# Patient Record
Sex: MALE | Race: WHITE | Employment: UNEMPLOYED | URBAN - METROPOLITAN AREA
[De-identification: names, ages, dates, MRNs, and addresses within clinical notes are randomized per-mention and may not be internally consistent; named-entity substitution may affect disease eponyms.]

---

## 2022-01-05 ENCOUNTER — APPOINTMENT (OUTPATIENT)
Dept: URGENT CARE | Facility: CLINIC | Age: 42
End: 2022-01-05

## 2022-02-06 ENCOUNTER — HEALTH MAINTENANCE LETTER (OUTPATIENT)
Age: 42
End: 2022-02-06

## 2022-04-20 ENCOUNTER — OFFICE VISIT (OUTPATIENT)
Dept: DERMATOLOGY | Facility: CLINIC | Age: 42
End: 2022-04-20
Attending: DERMATOLOGY
Payer: COMMERCIAL

## 2022-04-20 VITALS
WEIGHT: 211.86 LBS | HEART RATE: 79 BPM | SYSTOLIC BLOOD PRESSURE: 112 MMHG | DIASTOLIC BLOOD PRESSURE: 76 MMHG | BODY MASS INDEX: 27.76 KG/M2

## 2022-04-20 DIAGNOSIS — L91.8 INFLAMED SKIN TAG: ICD-10-CM

## 2022-04-20 DIAGNOSIS — D17.1 LIPOMA OF TORSO: Primary | ICD-10-CM

## 2022-04-20 PROCEDURE — G0463 HOSPITAL OUTPT CLINIC VISIT: HCPCS

## 2022-04-20 PROCEDURE — 17110 DESTRUCTION B9 LES UP TO 14: CPT | Performed by: DERMATOLOGY

## 2022-04-20 PROCEDURE — 99203 OFFICE O/P NEW LOW 30 MIN: CPT | Mod: 25 | Performed by: DERMATOLOGY

## 2022-04-20 ASSESSMENT — PAIN SCALES - GENERAL: PAINLEVEL: NO PAIN (0)

## 2022-04-20 NOTE — PATIENT INSTRUCTIONS
[facility]- Pediatric Dermatology  Dr. Angie Burr, Dr. Judah Lopez, Dr. Briana Giraldo, Dr. Shyanne Bustillo, BEL[address]N [address] Dr. Chantale Crouch    Non Urgent  Nurse Triage Line; [phone]- Nanette and Kaylin SWAN Care Coordinators    Elizabeth (/Complex ) [phone]    If you need a prescription refill, please contact your pharmacy. Refills are approved or denied by our Physicians during normal business hours, Monday through Fridays  Per office policy, refills will not be granted if you have not been seen within the past year (or sooner depending on your child's condition)      Scheduling Information:   Pediatric Appointment Scheduling and Call Center [phone]   Radiology Scheduling- [phone]   Sedation Unit Scheduling- [phone]  [place] Scheduling- [facility] [phone]; Pediatric Dermatology Clinic [phone]  Main  Services: [phone]   Guatemalan: [phone]   Indonesian: [phone]   Hmong/Swiss/Issac: [phone]    Preadmission Nursing Department Fax Number: [phone] (Fax all pre-operative paperwork to this number)      For urgent matters arising during evenings, weekends, or holidays that cannot wait for normal business hours please call [phone] and ask for the Dermatology Resident On-Call to be paged.

## 2022-04-20 NOTE — PROGRESS NOTES
CHIEF COMPLAINT:  Skin tag by eye.    DERMATOLOGY PROBLEM LIST:  1.  Acrochordon.  2.  Lipoma.    HISTORY OF PRESENT ILLNESS:  Mr. Ramos is a 41-year-old male presenting to Dermatology for evaluation and treatment of a growth by his right eye.  It is increasing in size and bothersome.  He has had no past treatment.  In addition, he has a lump on his right upper back.  This has been growing slowly over the last decade.  It is not currently painful.    PAST MEDICAL HISTORY:  Otherwise healthy.    ALLERGIES:  None.    MEDICATIONS:  None.    SOCIAL HISTORY:  The patient is  with 3 children.  He lives in [place].    REVIEW OF SYSTEMS:  A 12-point review of systems was collected and was negative.    PHYSICAL EXAMINATION:  GENERAL:  The patient is a healthy-appearing 41-year-old male, in no distress.  SKIN:  Exam was focused on the face and back.  Examination of the face shows 3 mm pedunculated fleshy papule just inferior to the lid margin on the right lower eyelid.  Examination of the right upper back over the shoulder and upper midback shows an approximately 8 cm soft subcutaneous nodule.    ASSESSMENT AND PLAN:  1.  Irritated skin tag at the right eyelid.  Area treated with three 10-second freeze-thaw cycles with cold forceps.  Discussed potential wound care.  2.  Presumed lipoma, right upper back.  Referral placed to General Surgery.  Family to reach out to schedule an appointment.    The patient to return to Dermatology Clinic as needed.    Briana Giraldo MD   of Dermatology  [facility]

## 2022-04-20 NOTE — LETTER
4/20/2022      RE: Luis Carlos Ramos   [address]  [facility] [address]       CHIEF COMPLAINT:  Skin tag by eye.    DERMATOLOGY PROBLEM LIST:  1.  Acrochordon.  2.  Lipoma.    HISTORY OF PRESENT ILLNESS:  Mr. Ramos is a 41-year-old male presenting to Dermatology for evaluation and treatment of a growth by his right eye.  It is increasing in size and bothersome.  He has had no past treatment.  In addition, he has a lump on his right upper back.  This has been growing slowly over the last decade.  It is not currently painful.    PAST MEDICAL HISTORY:  Otherwise healthy.    ALLERGIES:  None.    MEDICATIONS:  None.    SOCIAL HISTORY:  The patient is  with 3 children.  He lives in [place].    REVIEW OF SYSTEMS:  A 12-point review of systems was collected and was negative.    PHYSICAL EXAMINATION:  GENERAL:  The patient is a healthy-appearing 41-year-old male, in no distress.  SKIN:  Exam was focused on the face and back.  Examination of the face shows 3 mm pedunculated fleshy papule just inferior to the lid margin on the right lower eyelid.  Examination of the right upper back over the shoulder and upper midback shows an approximately 8 cm soft subcutaneous nodule.    ASSESSMENT AND PLAN:  1.  Irritated skin tag at the right eyelid.  Area treated with three 10-second freeze-thaw cycles with cold forceps.  Discussed potential wound care.  2.  Presumed lipoma, right upper back.  Referral placed to General Surgery.  Family to reach out to schedule an appointment.    The patient to return to Dermatology Clinic as needed.    Briana Giraldo MD   of Dermatology  [facility]

## 2022-04-20 NOTE — NURSING NOTE
"[facility] [[id number]]  Chief Complaint   Patient presents with     Consult     Skin tag     Initial /76   Pulse 79   Wt 211 lb 13.8 oz (96.1 kg)   BMI 27.76 kg/m   Estimated body mass index is 27.76 kg/m  as calculated from the following:    Height as of 8/9/12: 6' 1.25\" (186.1 cm).    Weight as of this encounter: 211 lb 13.8 oz (96.1 kg).  Medication Reconciliation: complete      "

## 2022-09-03 ENCOUNTER — HEALTH MAINTENANCE LETTER (OUTPATIENT)
Age: 42
End: 2022-09-03

## 2022-09-29 ENCOUNTER — TRANSFERRED RECORDS (OUTPATIENT)
Dept: HEALTH INFORMATION MANAGEMENT | Facility: CLINIC | Age: 42
End: 2022-09-29

## 2022-10-07 ENCOUNTER — TRANSFERRED RECORDS (OUTPATIENT)
Dept: HEALTH INFORMATION MANAGEMENT | Facility: CLINIC | Age: 42
End: 2022-10-07

## 2022-11-24 ENCOUNTER — TELEPHONE (OUTPATIENT)
Dept: PEDIATRICS | Facility: CLINIC | Age: 42
End: 2022-11-24

## 2022-11-24 DIAGNOSIS — J11.1 INFLUENZA: Primary | ICD-10-CM

## 2022-11-24 RX ORDER — OSELTAMIVIR PHOSPHATE 75 MG/1
75 CAPSULE ORAL 2 TIMES DAILY
Qty: 10 CAPSULE | Refills: 0 | Status: SHIPPED | OUTPATIENT
Start: 2022-11-24 | End: 2022-11-29

## 2022-11-24 NOTE — TELEPHONE ENCOUNTER
Patient's wife calling in, patient is exhibiting symptoms of influenza and son is infected with known influenza. Wife has chronic lung disease, requesting treatment for  with Tamiflu given likelihood of infection and high risk for herself.    Prescription for Tamiflu sent to patient's requested pharmacy.    Kimberly Velásquez MD  Internal Medicine-Pediatrics

## 2023-04-29 ENCOUNTER — HEALTH MAINTENANCE LETTER (OUTPATIENT)
Age: 43
End: 2023-04-29

## 2023-10-05 ENCOUNTER — TRANSFERRED RECORDS (OUTPATIENT)
Dept: HEALTH INFORMATION MANAGEMENT | Facility: CLINIC | Age: 43
End: 2023-10-05
Payer: COMMERCIAL

## 2024-01-11 ENCOUNTER — OFFICE VISIT (OUTPATIENT)
Dept: DERMATOLOGY | Facility: CLINIC | Age: 44
End: 2024-01-11
Attending: DERMATOLOGY

## 2024-01-11 VITALS — HEIGHT: 73 IN | BODY MASS INDEX: 25.84 KG/M2 | WEIGHT: 195 LBS

## 2024-01-11 DIAGNOSIS — L57.0 AK (ACTINIC KERATOSIS): Primary | ICD-10-CM

## 2024-01-11 PROCEDURE — 99213 OFFICE O/P EST LOW 20 MIN: CPT | Performed by: DERMATOLOGY

## 2024-01-11 PROCEDURE — 17000 DESTRUCT PREMALG LESION: CPT | Performed by: DERMATOLOGY

## 2024-01-11 PROCEDURE — 17003 DESTRUCT PREMALG LES 2-14: CPT | Performed by: DERMATOLOGY

## 2024-01-11 ASSESSMENT — PAIN SCALES - GENERAL: PAINLEVEL: NO PAIN (0)

## 2024-01-11 NOTE — PROGRESS NOTES
CHIEF COMPLAINT:  Skin tag by eye.    DERMATOLOGY PROBLEM LIST:  1.  Acrochordon.  2.  Lipoma.  3.  AK     HISTORY OF PRESENT ILLNESS:  Mr. Ramos is a 43-year-old male presenting to Dermatology for evaluation and treatment of a growth on the scalp for several months. Not healing. Some mild tenderness.     PAST MEDICAL HISTORY:  Otherwise healthy.    ALLERGIES:  None.    MEDICATIONS:  None.    SOCIAL HISTORY:  The patient is  with 3 children.  He lives in [place].    PHYSICAL EXAMINATION:  GENERAL:  The patient is a healthy-appearing 43-year-old male, in no distress.  SKIN:  Exam was focused on the face and scalp  -Gritty approx 4 mm papule on the vertex scalp  -Gritty papule on the R helix, L vertex, L infraorbital rim and temple  -Telangiectasias on the central face    ASSESSMENT AND PLAN:  Actinic keratoses: 5 lesions treated with a 10 sec freeze/thaw cycle with liquid nitrogen. Wound care info provided. Discussed risks of pain, blistering, scar, incomplete removal. Patient advised to return should lesions fail to resolve.      RTC as needed.     Briana Giraldo MD   of Dermatology  [facility]

## 2024-01-11 NOTE — LETTER
1/11/2024      RE: Luis Carlos Ramos  [address] [facility] [address]     Dear Colleague,    Thank you for the opportunity to participate in the care of your patient, Luis Carlos Ramos, at the [facility] at [facility]. Please see a copy of my visit note below.    CHIEF COMPLAINT:  Skin tag by eye.    DERMATOLOGY PROBLEM LIST:  1.  Acrochordon.  2.  Lipoma.  3.  AK     HISTORY OF PRESENT ILLNESS:  Mr. Ramos is a 43-year-old male presenting to Dermatology for evaluation and treatment of a growth on the scalp for several months. Not healing. Some mild tenderness.     PAST MEDICAL HISTORY:  Otherwise healthy.    ALLERGIES:  None.    MEDICATIONS:  None.    SOCIAL HISTORY:  The patient is  with 3 children.  He lives in [place].    PHYSICAL EXAMINATION:  GENERAL:  The patient is a healthy-appearing 43-year-old male, in no distress.  SKIN:  Exam was focused on the face and scalp  -Gritty approx 4 mm papule on the vertex scalp  -Gritty papule on the R helix, L vertex, L infraorbital rim and temple  -Telangiectasias on the central face    ASSESSMENT AND PLAN:  Actinic keratoses: 5 lesions treated with a 10 sec freeze/thaw cycle with liquid nitrogen. Wound care info provided. Discussed risks of pain, blistering, scar, incomplete removal. Patient advised to return should lesions fail to resolve.      RTC as needed.     Briana Giraldo MD   of Dermatology  [facility]

## 2024-07-06 ENCOUNTER — HEALTH MAINTENANCE LETTER (OUTPATIENT)
Age: 44
End: 2024-07-06

## 2024-10-09 ENCOUNTER — APPOINTMENT (OUTPATIENT)
Dept: GENERAL RADIOLOGY | Facility: CLINIC | Age: 44
End: 2024-10-09
Attending: EMERGENCY MEDICINE
Payer: COMMERCIAL

## 2024-10-09 ENCOUNTER — HOSPITAL ENCOUNTER (EMERGENCY)
Facility: CLINIC | Age: 44
Discharge: HOME | End: 2024-10-09
Attending: EMERGENCY MEDICINE | Admitting: EMERGENCY MEDICINE
Payer: COMMERCIAL

## 2024-10-09 VITALS
TEMPERATURE: 97.4 F | SYSTOLIC BLOOD PRESSURE: 136 MMHG | BODY MASS INDEX: 28.58 KG/M2 | DIASTOLIC BLOOD PRESSURE: 95 MMHG | WEIGHT: 210.98 LBS | RESPIRATION RATE: 16 BRPM | OXYGEN SATURATION: 99 % | HEIGHT: 72 IN | HEART RATE: 73 BPM

## 2024-10-09 DIAGNOSIS — R07.89 CHEST PAIN, NON-CARDIAC: ICD-10-CM

## 2024-10-09 LAB
ANION GAP SERPL CALCULATED.3IONS-SCNC: 12 MMOL/L (ref 7–15)
BASOPHILS # BLD AUTO: 0.1 10E3/UL (ref 0–0.2)
BASOPHILS NFR BLD AUTO: 1 %
BUN SERPL-MCNC: 17.2 MG/DL (ref 6–20)
CALCIUM SERPL-MCNC: 9.5 MG/DL (ref 8.8–10.4)
CHLORIDE SERPL-SCNC: 101 MMOL/L (ref 98–107)
CREAT SERPL-MCNC: 0.88 MG/DL (ref 0.67–1.17)
EGFRCR SERPLBLD CKD-EPI 2021: >90 ML/MIN/1.73M2
EOSINOPHIL # BLD AUTO: 0.1 10E3/UL (ref 0–0.7)
EOSINOPHIL NFR BLD AUTO: 1 %
ERYTHROCYTE [DISTWIDTH] IN BLOOD BY AUTOMATED COUNT: 12 % (ref 10–15)
GLUCOSE SERPL-MCNC: 74 MG/DL (ref 70–99)
HCO3 SERPL-SCNC: 25 MMOL/L (ref 22–29)
HCT VFR BLD AUTO: 44.9 % (ref 40–53)
HGB BLD-MCNC: 15.6 G/DL (ref 13.3–17.7)
HOLD SPECIMEN: NORMAL
HOLD SPECIMEN: NORMAL
IMM GRANULOCYTES # BLD: 0 10E3/UL
IMM GRANULOCYTES NFR BLD: 0 %
LYMPHOCYTES # BLD AUTO: 2.7 10E3/UL (ref 0.8–5.3)
LYMPHOCYTES NFR BLD AUTO: 40 %
MCH RBC QN AUTO: 29.5 PG (ref 26.5–33)
MCHC RBC AUTO-ENTMCNC: 34.7 G/DL (ref 31.5–36.5)
MCV RBC AUTO: 85 FL (ref 78–100)
MONOCYTES # BLD AUTO: 0.6 10E3/UL (ref 0–1.3)
MONOCYTES NFR BLD AUTO: 9 %
NEUTROPHILS # BLD AUTO: 3.3 10E3/UL (ref 1.6–8.3)
NEUTROPHILS NFR BLD AUTO: 49 %
NRBC # BLD AUTO: 0 10E3/UL
NRBC BLD AUTO-RTO: 0 /100
PLATELET # BLD AUTO: 202 10E3/UL (ref 150–450)
POTASSIUM SERPL-SCNC: 4.1 MMOL/L (ref 3.4–5.3)
RBC # BLD AUTO: 5.29 10E6/UL (ref 4.4–5.9)
SODIUM SERPL-SCNC: 138 MMOL/L (ref 135–145)
TROPONIN T SERPL HS-MCNC: <6 NG/L
TROPONIN T SERPL HS-MCNC: <6 NG/L
WBC # BLD AUTO: 6.7 10E3/UL (ref 4–11)

## 2024-10-09 PROCEDURE — 80048 BASIC METABOLIC PNL TOTAL CA: CPT | Performed by: EMERGENCY MEDICINE

## 2024-10-09 PROCEDURE — 93005 ELECTROCARDIOGRAM TRACING: CPT

## 2024-10-09 PROCEDURE — 84484 ASSAY OF TROPONIN QUANT: CPT | Performed by: EMERGENCY MEDICINE

## 2024-10-09 PROCEDURE — 36415 COLL VENOUS BLD VENIPUNCTURE: CPT | Performed by: EMERGENCY MEDICINE

## 2024-10-09 PROCEDURE — 85025 COMPLETE CBC W/AUTO DIFF WBC: CPT | Performed by: EMERGENCY MEDICINE

## 2024-10-09 PROCEDURE — 71046 X-RAY EXAM CHEST 2 VIEWS: CPT

## 2024-10-09 PROCEDURE — 99285 EMERGENCY DEPT VISIT HI MDM: CPT

## 2024-10-09 PROCEDURE — 85004 AUTOMATED DIFF WBC COUNT: CPT | Performed by: EMERGENCY MEDICINE

## 2024-10-09 ASSESSMENT — COLUMBIA-SUICIDE SEVERITY RATING SCALE - C-SSRS
2. HAVE YOU ACTUALLY HAD ANY THOUGHTS OF KILLING YOURSELF IN THE PAST MONTH?: NO
6. HAVE YOU EVER DONE ANYTHING, STARTED TO DO ANYTHING, OR PREPARED TO DO ANYTHING TO END YOUR LIFE?: NO
1. IN THE PAST MONTH, HAVE YOU WISHED YOU WERE DEAD OR WISHED YOU COULD GO TO SLEEP AND NOT WAKE UP?: NO

## 2024-10-09 ASSESSMENT — ACTIVITIES OF DAILY LIVING (ADL)
ADLS_ACUITY_SCORE: 35

## 2024-10-09 NOTE — ED TRIAGE NOTES
"      Pt presents to ED with left sided chest tightness. Started today, feels tighter with movement. States about every 30 minutes he gets a \"needle prick\" feeling to his neck. Denies new or heavy lifting/work outs. Denies sob.   "

## 2024-10-10 ENCOUNTER — PATIENT OUTREACH (OUTPATIENT)
Dept: PEDIATRICS | Facility: CLINIC | Age: 44
End: 2024-10-10
Payer: COMMERCIAL

## 2024-10-10 LAB
ATRIAL RATE - MUSE: 67 BPM
DIASTOLIC BLOOD PRESSURE - MUSE: NORMAL MMHG
INTERPRETATION ECG - MUSE: NORMAL
P AXIS - MUSE: 4 DEGREES
PR INTERVAL - MUSE: 156 MS
QRS DURATION - MUSE: 108 MS
QT - MUSE: 380 MS
QTC - MUSE: 401 MS
R AXIS - MUSE: -1 DEGREES
SYSTOLIC BLOOD PRESSURE - MUSE: NORMAL MMHG
T AXIS - MUSE: 20 DEGREES
VENTRICULAR RATE- MUSE: 67 BPM

## 2024-10-10 NOTE — ED PROVIDER NOTES
Emergency Department Note      History of Present Illness     Chief Complaint   Chest Pain      HPI   Luis Carlos Ramos is a 44 year old male who presents to the ED with his wife for evaluation of chest pain. The patient states he started to experience constant left-sided chest tightness earlier today that is worse with certain movements but is otherwise unprovoked.  He points to an area over the lateral pectoralis muscle, anterior axillary line.  Also notes a sharp pain in his left neck every half hour or so that lasts only a couple of seconds before spontaneously resolving. Notes the pain feels as though he had just lifted weights although he has not recently. He was encouraged to be seen here by his wife who is a nurse. Endorses a mild dry cough. His son is currently ill with a cough as well. Reports a family history of hypertension in his brother and heart disease with stents in his father. He has a history of smoking just while in college, but quit many years ago. Denies shortness of breath, dizziness, numbness or weakness of the extremities, pain or edema of the lower extremities, fever, or chills.     Independent Historian   None    Review of External Notes   No recent relevant notes.    Past Medical History     Medical History and Problem List   No other significant past medical history or family history.    Medications   The patient is not currently taking any prescribed medications.    Physical Exam     Patient Vitals for the past 24 hrs:   BP Temp Pulse Resp SpO2 Height Weight   10/09/24 1834 (!) 133/109 -- -- -- -- -- --   10/09/24 1831 -- 97.4  F (36.3  C) 73 16 99 % 1.829 m (6') 95.7 kg (210 lb 15.7 oz)     Physical Exam  General: alert, comfortable in fast track.  HENT: mucous membranes moist, FROM in the neck, no left lateral neck swelling.  CV: regular rate, regular rhythm, no m/r/g  Resp: normal effort, clear throughout, no crackles or wheezing  GI: abdomen soft and nontender, no guarding  MSK:  no bony tenderness, no reproducible left chest wall tenderness.  Skin: appropriately warm and dry, no rash to the left chest  Extremities: no edema, calves non-tender  Neuro: alert, clear speech, oriented  Psych: normal mood and affect      Diagnostics     Lab Results   Labs Ordered and Resulted from Time of ED Arrival to Time of ED Departure   TROPONIN T, HIGH SENSITIVITY - Normal       Result Value    Troponin T, High Sensitivity <6     BASIC METABOLIC PANEL - Normal    Sodium 138      Potassium 4.1      Chloride 101      Carbon Dioxide (CO2) 25      Anion Gap 12      Urea Nitrogen 17.2      Creatinine 0.88      GFR Estimate >90      Calcium 9.5      Glucose 74     CBC WITH PLATELETS AND DIFFERENTIAL    WBC Count 6.7      RBC Count 5.29      Hemoglobin 15.6      Hematocrit 44.9      MCV 85      MCH 29.5      MCHC 34.7      RDW 12.0      Platelet Count 202      % Neutrophils 49      % Lymphocytes 40      % Monocytes 9      % Eosinophils 1      % Basophils 1      % Immature Granulocytes 0      NRBCs per 100 WBC 0      Absolute Neutrophils 3.3      Absolute Lymphocytes 2.7      Absolute Monocytes 0.6      Absolute Eosinophils 0.1      Absolute Basophils 0.1      Absolute Immature Granulocytes 0.0      Absolute NRBCs 0.0         Imaging   Chest XR,  PA & LAT   Final Result   IMPRESSION: Negative chest.          EKG   ECG taken at 1842, ECG read at 1852  Sinus rhythm   No previous EKG to compare.   Rate 67 bpm. PA interval 156 ms. QRS duration 108 ms. QT/QTc 380/401 ms. P-R-T axes 4 -1 20.    Independent Interpretation   I personally reviewed the patient's chest radiograph.  No infiltrate or pneumothorax on my interpretation.      ED Course      Medications Administered   Medications - No data to display    Procedures   Procedures     Discussion of Management   None    ED Course   ED Course as of 10/10/24 1044   Wed Oct 09, 2024   1924 I obtained history and performed a physical exam as noted above.    2236 I rechecked  and updated the patient.        Additional Documentation  None    Medical Decision Making / Diagnosis     CMS Diagnoses: None    MIPS       None    MDM   Luis Carlos Ramos is a 44 year old male otherwise healthy, who presents today with chest pain.  On exam, the patient is slightly hypertensive, otherwise has normal vitals.  Workup is reassuring, including x 2 negative troponins and EKG without signs of ischemia.  Patient is PERC negative, which rules out PE in an otherwise low risk patient.   I do not suspect aortic dissection.  He has normal mediastinum, 2+ radial pulses, and atypical history.  The patient did complain of slight left sided neck pain as well.  No suspicion for carotid artery dissection given mild pain and no risk factors (trauma, neck manipulation).  No evidence for zoster on exam.  I do not think he needs additional testing, including risk stratification, given the atypical nature of symptoms.  Return to the ED right away for worsening pain, new symptoms such as fever cough, or for any other concerns.    Disposition   The patient was discharged.     Diagnosis     ICD-10-CM    1. Chest pain, non-cardiac  R07.89            Scribe Disclosure:  I, Caryl Vela, am serving as a scribe at 7:19 PM on 10/9/2024 to document services personally performed by Destiney Faustin MD based on my observations and the provider's statements to me.        Destiney Faustin MD  10/10/24 8218

## 2024-11-22 ENCOUNTER — ANCILLARY PROCEDURE (OUTPATIENT)
Dept: GENERAL RADIOLOGY | Facility: CLINIC | Age: 44
End: 2024-11-22
Attending: PHYSICIAN ASSISTANT
Payer: COMMERCIAL

## 2024-11-22 DIAGNOSIS — R50.9 FEVER, UNSPECIFIED FEVER CAUSE: ICD-10-CM

## 2024-11-22 PROCEDURE — 71046 X-RAY EXAM CHEST 2 VIEWS: CPT | Mod: TC | Performed by: RADIOLOGY

## 2024-11-23 ENCOUNTER — TELEPHONE (OUTPATIENT)
Dept: NURSING | Facility: CLINIC | Age: 44
End: 2024-11-23
Payer: COMMERCIAL

## 2024-11-23 NOTE — TELEPHONE ENCOUNTER
Patient classified as COVID treatment eligible by Epic high risk algorithm:  Yes    Coronavirus (COVID-19) Notification    Reason for call  Notify of POSITIVE COVID-19 lab result, assess symptoms,  review [facility] recommendations    Lab Result   Lab test for 2019-nCoV rRt-PCR or SARS-COV-2 PCR  Oropharyngeal AND/OR nasopharyngeal swabs were POSITIVE for 2019-nCoV RNA [OR] SARS-COV-2 RNA (COVID-19) RNA     We have been unable to reach patient by phone at this time to notify of their Positive COVID-19 result.    Left voicemail message requesting a call back to [phone] [facility] for results.        A Positive COVID-19 letter will be sent via [employer] or the mail.    Chapin Moyer

## 2025-01-09 ENCOUNTER — OFFICE VISIT (OUTPATIENT)
Dept: PEDIATRICS | Facility: CLINIC | Age: 45
End: 2025-01-09
Payer: COMMERCIAL

## 2025-01-09 VITALS
HEIGHT: 73 IN | RESPIRATION RATE: 18 BRPM | HEART RATE: 74 BPM | BODY MASS INDEX: 28.03 KG/M2 | OXYGEN SATURATION: 98 % | DIASTOLIC BLOOD PRESSURE: 72 MMHG | WEIGHT: 211.5 LBS | SYSTOLIC BLOOD PRESSURE: 122 MMHG | TEMPERATURE: 97.1 F

## 2025-01-09 DIAGNOSIS — Z13.1 SCREENING FOR DIABETES MELLITUS: ICD-10-CM

## 2025-01-09 DIAGNOSIS — Z13.220 LIPID SCREENING: ICD-10-CM

## 2025-01-09 DIAGNOSIS — Z23 NEED FOR PROPHYLACTIC VACCINATION AND INOCULATION AGAINST INFLUENZA: ICD-10-CM

## 2025-01-09 DIAGNOSIS — Z11.59 NEED FOR HEPATITIS C SCREENING TEST: ICD-10-CM

## 2025-01-09 DIAGNOSIS — N52.9 ERECTILE DYSFUNCTION, UNSPECIFIED ERECTILE DYSFUNCTION TYPE: ICD-10-CM

## 2025-01-09 DIAGNOSIS — Z00.00 ROUTINE GENERAL MEDICAL EXAMINATION AT A HEALTH CARE FACILITY: Primary | ICD-10-CM

## 2025-01-09 DIAGNOSIS — Z11.4 SCREENING FOR HIV (HUMAN IMMUNODEFICIENCY VIRUS): ICD-10-CM

## 2025-01-09 DIAGNOSIS — Z12.11 SCREEN FOR COLON CANCER: ICD-10-CM

## 2025-01-09 LAB
ALBUMIN SERPL BCG-MCNC: 4.4 G/DL (ref 3.5–5.2)
ALP SERPL-CCNC: 86 U/L (ref 40–150)
ALT SERPL W P-5'-P-CCNC: 25 U/L (ref 0–70)
ANION GAP SERPL CALCULATED.3IONS-SCNC: 8 MMOL/L (ref 7–15)
AST SERPL W P-5'-P-CCNC: 19 U/L (ref 0–45)
BILIRUB SERPL-MCNC: 0.3 MG/DL
BUN SERPL-MCNC: 13.4 MG/DL (ref 6–20)
CALCIUM SERPL-MCNC: 9.4 MG/DL (ref 8.8–10.4)
CHLORIDE SERPL-SCNC: 104 MMOL/L (ref 98–107)
CHOLEST SERPL-MCNC: 292 MG/DL
CREAT SERPL-MCNC: 0.88 MG/DL (ref 0.67–1.17)
EGFRCR SERPLBLD CKD-EPI 2021: >90 ML/MIN/1.73M2
EST. AVERAGE GLUCOSE BLD GHB EST-MCNC: 117 MG/DL
FASTING STATUS PATIENT QL REPORTED: YES
FASTING STATUS PATIENT QL REPORTED: YES
GLUCOSE SERPL-MCNC: 97 MG/DL (ref 70–99)
HBA1C MFR BLD: 5.7 % (ref 0–5.6)
HCO3 SERPL-SCNC: 28 MMOL/L (ref 22–29)
HCV AB SERPL QL IA: NONREACTIVE
HDLC SERPL-MCNC: 45 MG/DL
HIV 1+2 AB+HIV1 P24 AG SERPL QL IA: NONREACTIVE
LDLC SERPL CALC-MCNC: 206 MG/DL
NONHDLC SERPL-MCNC: 247 MG/DL
POTASSIUM SERPL-SCNC: 4.7 MMOL/L (ref 3.4–5.3)
PROT SERPL-MCNC: 6.9 G/DL (ref 6.4–8.3)
SODIUM SERPL-SCNC: 140 MMOL/L (ref 135–145)
TRIGL SERPL-MCNC: 203 MG/DL

## 2025-01-09 RX ORDER — SILDENAFIL CITRATE 20 MG/1
TABLET ORAL
Qty: 30 TABLET | Refills: 5 | Status: SHIPPED | OUTPATIENT
Start: 2025-01-09

## 2025-01-09 SDOH — HEALTH STABILITY: PHYSICAL HEALTH: ON AVERAGE, HOW MANY DAYS PER WEEK DO YOU ENGAGE IN MODERATE TO STRENUOUS EXERCISE (LIKE A BRISK WALK)?: 2 DAYS

## 2025-01-09 SDOH — HEALTH STABILITY: PHYSICAL HEALTH: ON AVERAGE, HOW MANY MINUTES DO YOU ENGAGE IN EXERCISE AT THIS LEVEL?: 20 MIN

## 2025-01-09 ASSESSMENT — SOCIAL DETERMINANTS OF HEALTH (SDOH): HOW OFTEN DO YOU GET TOGETHER WITH FRIENDS OR RELATIVES?: ONCE A WEEK

## 2025-01-09 ASSESSMENT — PAIN SCALES - GENERAL: PAINLEVEL_OUTOF10: MILD PAIN (3)

## 2025-01-09 NOTE — PROGRESS NOTES
"Preventive Care Visit  [facility] JESSICA Zhang MD, Internal Medicine - Pediatrics  Jan 9, 2025      Assessment & Plan     Routine general medical examination at a health care facility  Counseling as below. Flu and Tdap vaccines today.   - Comprehensive metabolic panel (BMP + Alb, Alk Phos, ALT, AST, Total. Bili, TP); Future    Erectile dysfunction, unspecified erectile dysfunction type  Discussed pathophysiology and treatment options. Patient interested in medication. Reviewed risks, benefits. Follow-up prn. BP well controlled, screening labs as below.   - sildenafil (REVATIO) 20 MG tablet; Take 3-5 tabs 30 minutes prior to sexual activity.    Screen for colon cancer  - Colonoscopy Screening  Referral; Future    Screening for HIV (human immunodeficiency virus)  - HIV Antigen Antibody Combo; Future    Need for hepatitis C screening test  - Hepatitis C Screen Reflex to HCV RNA Quant and Genotype; Future    Screening for diabetes mellitus  - Hemoglobin A1c; Future    Lipid screening  - Lipid panel reflex to direct LDL Non-fasting; Future    Need for prophylactic vaccination and inoculation against influenza  - flu vaccine.         BMI  Estimated body mass index is 28.1 kg/m  as calculated from the following:    Height as of this encounter: 1.848 m (6' 0.75\").    Weight as of this encounter: 95.9 kg (211 lb 8 oz).   Weight management plan: Discussed healthy diet and exercise guidelines    Counseling  Appropriate preventive services were addressed with this patient via screening, questionnaire, or discussion as appropriate for fall prevention, nutrition, physical activity, Tobacco-use cessation, social engagement, weight loss and cognition.  Checklist reviewing preventive services available has been given to the patient.  Reviewed patient's diet, addressing concerns and/or questions.   He is at risk for lack of exercise and has been provided with information to increase physical activity " for the benefit of his well-being.   The patient was instructed to see the dentist every 6 months.   He is at risk for psychosocial distress and has been provided with information to reduce risk.       Patient Instructions   Labs today.     Tetanus booster and flu shot today.     Sildenafil (viagra) sent to pharmacy - take 1-5 tabs 30 minutes prior to sexual activity as needed.     Call to schedule colonoscopy [phone]    Aramis Aldridge is a 44 year old, presenting for the following:  Physical        1/9/2025     9:07 AM   Additional Questions   Roomed by Mattie   Accompanied by none         1/9/2025     9:07 AM   Patient Reported Additional Medications   Patient reports taking the following new medications none          HPI    Overall doing relatively well. Occasional back pain with hyperextension.     Has been struggling to have and maintain erections during intercourse. Wondering if medication is appropriate at this time.     Was seen in ED for chest pain, but no issues since then and negative work-up at that time.     Health Care Directive  Patient does not have a Health Care Directive: Discussed advance care planning with patient; information given to patient to review.      1/9/2025   General Health   How would you rate your overall physical health? (!) FAIR   Feel stress (tense, anxious, or unable to sleep) To some extent   (!) STRESS CONCERN      1/9/2025   Nutrition   Three or more servings of calcium each day? Yes   Diet: Regular (no restrictions)   How many servings of fruit and vegetables per day? (!) 2-3   How many sweetened beverages each day? (!) 2         1/9/2025   Exercise   Days per week of moderate/strenous exercise 2 days   Average minutes spent exercising at this level 20 min   (!) EXERCISE CONCERN      1/9/2025   Social Factors   Frequency of gathering with friends or relatives Once a week   Worry food won't last until get money to buy more No   Food not last or not have enough money  for food? No   Do you have housing? (Housing is defined as stable permanent housing and does not include staying ouside in a car, in a tent, in an abandoned building, in an overnight shelter, or couch-surfing.) Yes   Are you worried about losing your housing? No   Lack of transportation? No   Unable to get utilities (heat,electricity)? No         1/9/2025   Dental   Dentist two times every year? (!) NO         1/9/2025   TB Screening   Were you born outside of the [place]? No         Today's PHQ-2 Score:       1/9/2025     8:08 AM   PHQ-2 ( 1999 Pfizer)   Q1: Little interest or pleasure in doing things 0   Q2: Feeling down, depressed or hopeless 0   PHQ-2 Score 0    Q1: Little interest or pleasure in doing things Not at all   Q2: Feeling down, depressed or hopeless Not at all   PHQ-2 Score 0       Patient-reported           1/9/2025   Substance Use   Alcohol more than 3/day or more than 7/wk No   Do you use any other substances recreationally? (!) CANNABIS PRODUCTS     Social History     Tobacco Use    Smoking status: Former     Current packs/day: 0.00     Types: Cigarettes     Quit date: 1/1/2010     Years since quitting: 15.0    Smokeless tobacco: Never   Vaping Use    Vaping status: Never Used   Substance Use Topics    Alcohol use: Yes     Alcohol/week: 0.8 - 1.7 standard drinks of alcohol     Types: 1 - 2 drink(s) per week    Drug use: No             1/9/2025   One time HIV Screening   Previous HIV test? Yes         1/9/2025   STI Screening   New sexual partner(s) since last STI/HIV test? No   ASCVD Risk   The ASCVD Risk score (Lindsey GARCIA, et al., 2019) failed to calculate for the following reasons:    Cannot find a previous HDL lab    Cannot find a previous total cholesterol lab        1/9/2025   Contraception/Family Planning   Questions about contraception or family planning No        Reviewed and updated as needed this visit by Provider                    Patient Active Problem List   Diagnosis   (none) -  "all problems resolved or deleted     History reviewed. No pertinent surgical history.    Social History     Tobacco Use    Smoking status: Former     Current packs/day: 0.00     Types: Cigarettes     Quit date: 1/1/2010     Years since quitting: 15.0    Smokeless tobacco: Never   Substance Use Topics    Alcohol use: Yes     Alcohol/week: 0.8 - 1.7 standard drinks of alcohol     Types: 1 - 2 drink(s) per week     Family History   Problem Relation Age of Onset    Hypertension Father     C.A.D. Father         multiple stents    Lipids Brother     Hypertension Mother     Blood Disease Maternal Aunt         d from childhood luekemia    Respiratory Paternal Aunt         lung CA             Review of Systems  Constitutional, neuro, ENT, endocrine, pulmonary, cardiac, gastrointestinal, genitourinary, musculoskeletal, integument and psychiatric systems are negative, except as otherwise noted.     Objective    Exam  /72 (BP Location: Right arm, Patient Position: Sitting, Cuff Size: Adult Regular)   Pulse 74   Temp 97.1  F (36.2  C) (Temporal)   Resp 18   Ht 1.848 m (6' 0.75\")   Wt 95.9 kg (211 lb 8 oz)   SpO2 98%   BMI 28.10 kg/m     Estimated body mass index is 28.1 kg/m  as calculated from the following:    Height as of this encounter: 1.848 m (6' 0.75\").    Weight as of this encounter: 95.9 kg (211 lb 8 oz).    Physical Exam  GENERAL: alert and no distress  EYES: Eyes grossly normal to inspection, PERRL and conjunctivae and sclerae normal  HENT: ear canals and TM's normal, nose and mouth without ulcers or lesions  NECK: no adenopathy, no asymmetry, masses, or scars  RESP: lungs clear to auscultation - no rales, rhonchi or wheezes  CV: regular rate and rhythm, normal S1 S2, no S3 or S4, no murmur, click or rub, no peripheral edema  ABDOMEN: soft, nontender, no hepatosplenomegaly, no masses and bowel sounds normal  MS: no gross musculoskeletal defects noted, no edema  SKIN: no suspicious lesions or " ebony  NEURO: Normal strength and tone, mentation intact and speech normal  PSYCH: mentation appears normal, affect normal/bright        Signed Electronically by: Kimberly Zhang MD

## 2025-01-09 NOTE — PATIENT INSTRUCTIONS
Labs today.     Tetanus booster and flu shot today.     Sildenafil (viagra) sent to pharmacy - take 1-5 tabs 30 minutes prior to sexual activity as needed.     Call to schedule colonoscopy [phone]

## 2025-01-10 ENCOUNTER — MYC MEDICAL ADVICE (OUTPATIENT)
Dept: PEDIATRICS | Facility: CLINIC | Age: 45
End: 2025-01-10
Payer: COMMERCIAL

## 2025-01-13 ENCOUNTER — MYC MEDICAL ADVICE (OUTPATIENT)
Dept: PEDIATRICS | Facility: CLINIC | Age: 45
End: 2025-01-13
Payer: COMMERCIAL

## 2025-01-13 DIAGNOSIS — E78.2 MIXED HYPERLIPIDEMIA: Primary | ICD-10-CM

## 2025-01-13 RX ORDER — ATORVASTATIN CALCIUM 20 MG/1
20 TABLET, FILM COATED ORAL DAILY
Qty: 90 TABLET | Refills: 3 | Status: SHIPPED | OUTPATIENT
Start: 2025-01-13

## 2025-04-29 DIAGNOSIS — N52.9 ERECTILE DYSFUNCTION, UNSPECIFIED ERECTILE DYSFUNCTION TYPE: ICD-10-CM

## 2025-04-30 ENCOUNTER — MYC MEDICAL ADVICE (OUTPATIENT)
Dept: PEDIATRICS | Facility: CLINIC | Age: 45
End: 2025-04-30
Payer: COMMERCIAL

## 2025-04-30 ENCOUNTER — TELEPHONE (OUTPATIENT)
Dept: PEDIATRICS | Facility: CLINIC | Age: 45
End: 2025-04-30
Payer: COMMERCIAL

## 2025-04-30 RX ORDER — SILDENAFIL CITRATE 20 MG/1
TABLET ORAL
Qty: 90 TABLET | Refills: 1 | Status: SHIPPED | OUTPATIENT
Start: 2025-04-30

## 2025-04-30 NOTE — TELEPHONE ENCOUNTER
[facility] Pool19 minutes ago (10:38 AM)       This prior authorization has been denied, medication/diagnosis is excluded from coverage. Patient is able to use [employer], [employer]It or another discount card to help with the cost of the medication. An appeal is NOT available on an excluded medication/diagnosis. Please follow up with the patient and close encounter.  Thank You!  Anais Mcadams [facility]  Prior Auth Specialist

## 2025-04-30 NOTE — TELEPHONE ENCOUNTER
Please notify patient that slidenafil is excluded from his insurance's drug plan. He can pay out of pocket for this or look into Good Rx.     Kimberly Velásquez MD  Internal Medicine-Pediatrics

## 2025-04-30 NOTE — TELEPHONE ENCOUNTER
PRIOR AUTHORIZATION DENIED    Medication: SILDENAFIL CITRATE 20 MG PO TABS  Insurance Company: [employer] - Phone [phone] Fax [phone]  Denial Date: 4/30/2025  Denial Reason(s): Diagnosis Excluded  Appeal Information: N/A  Patient Notified: No

## 2025-05-01 NOTE — TELEPHONE ENCOUNTER
Patient sent [employer] inquiring about prescription coverage. RN notified of exclusion and advised to use [facility]. Please see mychart encounter dated  4/30 for details.  Sharda Ruiz RN   [facility]